# Patient Record
(demographics unavailable — no encounter records)

---

## 2024-10-23 NOTE — PHYSICAL EXAM
[Well Developed] : well developed [Well Nourished] : well nourished [No Acute Distress] : no acute distress [Normal Conjunctiva] : normal conjunctiva [Normal Venous Pressure] : normal venous pressure [No Carotid Bruit] : no carotid bruit [Normal S1, S2] : normal S1, S2 [No Rub] : no rub [No Gallop] : no gallop [Clear Lung Fields] : clear lung fields [Good Air Entry] : good air entry [No Respiratory Distress] : no respiratory distress  [Soft] : abdomen soft [Non Tender] : non-tender [No Masses/organomegaly] : no masses/organomegaly [Normal Bowel Sounds] : normal bowel sounds [Normal Gait] : normal gait [No Edema] : no edema [No Cyanosis] : no cyanosis [No Clubbing] : no clubbing [No Varicosities] : no varicosities [No Rash] : no rash [No Skin Lesions] : no skin lesions [Moves all extremities] : moves all extremities [No Focal Deficits] : no focal deficits [Normal Speech] : normal speech [Alert and Oriented] : alert and oriented [Normal memory] : normal memory [de-identified] : 3/6 systolic murmur

## 2024-10-23 NOTE — DISCUSSION/SUMMARY
[FreeTextEntry1] : 65 yo lady PMHx of paroxysmal SVT, VT/PVC, sudden cardiac death, NIDDM type 2, HLD, and HTN who is here as a follow up  Assessment 1. Hx of sudden cardiac death 2. HTN 3. HLD 4. Syncope Clinically c/w vasovagal syncope Has cardiac murmur 5. 1st deg AV block 6. TTE w/ mild-mod MR, mild TR, and dilated LA 7. Outpatient ECG monitor with significant finding of non sustained VT and 30 episodes of paroxysmal SVT CT coronary Oct/2024 w/ mild CAD so likely not ischemic in etiology  Plan 1. Metoprolol succinate 25mg PO QD for PVC/SVT 2. ASA 81mg PO QD  3. Losartan 100mg PO QD for HTN + DM2 + evidence of mild-mod MR on TTE (need good afterload reduction) 4. Stared on statin by PCP, to continue for CAD 5. RTC 3mo   During non face-to-face time, I reviewed relevant portions of the patient's medical record. During face-to-face time, I took a relevant history and examined the patient. I also explained differential diagnoses, relevant cardiac diagnoses, workup, and management plan.   Boris Riley M.D. Attending Cardiologist [facility]

## 2024-10-23 NOTE — DISCUSSION/SUMMARY
[FreeTextEntry1] : 63 yo lady PMHx of paroxysmal SVT, VT/PVC, sudden cardiac death, NIDDM type 2, HLD, and HTN who is here as a follow up  Assessment 1. Hx of sudden cardiac death 2. HTN 3. HLD 4. Syncope Clinically c/w vasovagal syncope Has cardiac murmur 5. 1st deg AV block 6. TTE w/ mild-mod MR, mild TR, and dilated LA 7. Outpatient ECG monitor with significant finding of non sustained VT and 30 episodes of paroxysmal SVT CT coronary Oct/2024 w/ mild CAD so likely not ischemic in etiology  Plan 1. Metoprolol succinate 25mg PO QD for PVC/SVT 2. ASA 81mg PO QD  3. Losartan 100mg PO QD for HTN + DM2 + evidence of mild-mod MR on TTE (need good afterload reduction) 4. Stared on statin by PCP, to continue for CAD 5. RTC 3mo   During non face-to-face time, I reviewed relevant portions of the patient's medical record. During face-to-face time, I took a relevant history and examined the patient. I also explained differential diagnoses, relevant cardiac diagnoses, workup, and management plan.   Boris Riley M.D. Attending Cardiologist [facility]

## 2024-10-23 NOTE — HISTORY OF PRESENT ILLNESS
[FreeTextEntry1] : 63 yo lady PMHx of paroxysmal SVT, VT/PVC, sudden cardiac death, NIDDM type 2, HLD, and HTN who is here as a follow up  10/23/24 No further syncope. No cardiac sx.  09/11/24 No new syncope. No cardiac sx.  08/15/24 No new syncope. No cardiac sx.  07/31/24 ROS + dizziness and syncope [place] train x 1. No prodromal cardiac symptoms. She felt lighthead and felt warmth.

## 2024-10-23 NOTE — PHYSICAL EXAM
[Well Developed] : well developed [Well Nourished] : well nourished [No Acute Distress] : no acute distress [Normal Conjunctiva] : normal conjunctiva [Normal Venous Pressure] : normal venous pressure [No Carotid Bruit] : no carotid bruit [Normal S1, S2] : normal S1, S2 [No Rub] : no rub [No Gallop] : no gallop [Clear Lung Fields] : clear lung fields [Good Air Entry] : good air entry [No Respiratory Distress] : no respiratory distress  [Soft] : abdomen soft [Non Tender] : non-tender [No Masses/organomegaly] : no masses/organomegaly [Normal Bowel Sounds] : normal bowel sounds [Normal Gait] : normal gait [No Edema] : no edema [No Cyanosis] : no cyanosis [No Clubbing] : no clubbing [No Varicosities] : no varicosities [No Rash] : no rash [No Skin Lesions] : no skin lesions [Moves all extremities] : moves all extremities [No Focal Deficits] : no focal deficits [Normal Speech] : normal speech [Alert and Oriented] : alert and oriented [Normal memory] : normal memory [de-identified] : 3/6 systolic murmur

## 2025-02-13 NOTE — HISTORY OF PRESENT ILLNESS
[FreeTextEntry1] : 65 yo lady PMHx of paroxysmal SVT, VT/PVC, sudden cardiac death, NIDDM type 2, HLD, and HTN   02/13/25 No further syncope. Had 1 isolated episode of plapitations 10/23/24 No further syncope. No cardiac sx.  09/11/24 No new syncope. No cardiac sx.  08/15/24 No new syncope. No cardiac sx.  07/31/24 ROS + dizziness and sync[place]. No prodromal cardiac symptoms. She felt lighthead and felt warmth.

## 2025-02-13 NOTE — PHYSICAL EXAM
[Well Developed] : well developed [Well Nourished] : well nourished [No Acute Distress] : no acute distress [Normal Conjunctiva] : normal conjunctiva [Normal Venous Pressure] : normal venous pressure [No Carotid Bruit] : no carotid bruit [Normal S1, S2] : normal S1, S2 [No Rub] : no rub [No Gallop] : no gallop [Clear Lung Fields] : clear lung fields [Good Air Entry] : good air entry [No Respiratory Distress] : no respiratory distress  [Soft] : abdomen soft [Non Tender] : non-tender [No Masses/organomegaly] : no masses/organomegaly [Normal Bowel Sounds] : normal bowel sounds [Normal Gait] : normal gait [No Edema] : no edema [No Cyanosis] : no cyanosis [No Clubbing] : no clubbing [No Varicosities] : no varicosities [No Rash] : no rash [No Skin Lesions] : no skin lesions [Moves all extremities] : moves all extremities [No Focal Deficits] : no focal deficits [Normal Speech] : normal speech [Alert and Oriented] : alert and oriented [Normal memory] : normal memory [de-identified] : 3/6 systolic murmur

## 2025-02-13 NOTE — DISCUSSION/SUMMARY
[FreeTextEntry1] : 65 yo lady PMHx of paroxysmal SVT, VT/PVC, sudden cardiac death, NIDDM type 2, HLD, and HTN   Assessment 1. Hx of sudden cardiac death 2. HTN 3. HLD 4. Syncope Clinically c/w vasovagal syncope Has cardiac murmur 5. 1st deg AV block 6. TTE w/ mild-mod MR, mild TR, and dilated LA 7. Outpatient ECG monitor with significant finding of non sustained VT and 30 episodes of paroxysmal SVT CT coronary Oct/2024 w/ mild CAD so likely not ischemic in etiology  Plan 1. Metoprolol succinate 25mg PO QD for PVC/SVT 2. ASA 81mg PO QD  3. Losartan 100mg PO QD for HTN + DM2 + evidence of mild-mod MR on TTE (need good afterload reduction) 4. Stared on statin by PCP, to continue for CAD 5. RTC 6mo for annual surveillance of moderate valvular disease  During non face-to-face time, I reviewed relevant portions of the patient's medical record. During face-to-face time, I took a relevant history and examined the patient. I also explained differential diagnoses, relevant cardiac diagnoses, workup, and management plan.   Boris Riley M.D. Attending Cardiologist [facility]

## 2025-02-13 NOTE — PHYSICAL EXAM
[Well Developed] : well developed [Well Nourished] : well nourished [No Acute Distress] : no acute distress [Normal Conjunctiva] : normal conjunctiva [Normal Venous Pressure] : normal venous pressure [No Carotid Bruit] : no carotid bruit [Normal S1, S2] : normal S1, S2 [No Rub] : no rub [No Gallop] : no gallop [Clear Lung Fields] : clear lung fields [Good Air Entry] : good air entry [No Respiratory Distress] : no respiratory distress  [Soft] : abdomen soft [Non Tender] : non-tender [No Masses/organomegaly] : no masses/organomegaly [Normal Bowel Sounds] : normal bowel sounds [Normal Gait] : normal gait [No Edema] : no edema [No Cyanosis] : no cyanosis [No Clubbing] : no clubbing [No Varicosities] : no varicosities [No Rash] : no rash [No Skin Lesions] : no skin lesions [Moves all extremities] : moves all extremities [No Focal Deficits] : no focal deficits [Normal Speech] : normal speech [Alert and Oriented] : alert and oriented [Normal memory] : normal memory [de-identified] : 3/6 systolic murmur

## 2025-02-13 NOTE — DISCUSSION/SUMMARY
[FreeTextEntry1] : 63 yo lady PMHx of paroxysmal SVT, VT/PVC, sudden cardiac death, NIDDM type 2, HLD, and HTN   Assessment 1. Hx of sudden cardiac death 2. HTN 3. HLD 4. Syncope Clinically c/w vasovagal syncope Has cardiac murmur 5. 1st deg AV block 6. TTE w/ mild-mod MR, mild TR, and dilated LA 7. Outpatient ECG monitor with significant finding of non sustained VT and 30 episodes of paroxysmal SVT CT coronary Oct/2024 w/ mild CAD so likely not ischemic in etiology  Plan 1. Metoprolol succinate 25mg PO QD for PVC/SVT 2. ASA 81mg PO QD  3. Losartan 100mg PO QD for HTN + DM2 + evidence of mild-mod MR on TTE (need good afterload reduction) 4. Stared on statin by PCP, to continue for CAD 5. RTC 6mo for annual surveillance of moderate valvular disease  During non face-to-face time, I reviewed relevant portions of the patient's medical record. During face-to-face time, I took a relevant history and examined the patient. I also explained differential diagnoses, relevant cardiac diagnoses, workup, and management plan.   Boris Riley M.D. Attending Cardiologist [facility]